# Patient Record
Sex: FEMALE | Race: WHITE | HISPANIC OR LATINO | URBAN - METROPOLITAN AREA
[De-identification: names, ages, dates, MRNs, and addresses within clinical notes are randomized per-mention and may not be internally consistent; named-entity substitution may affect disease eponyms.]

---

## 2022-03-15 ENCOUNTER — EMERGENCY (EMERGENCY)
Facility: HOSPITAL | Age: 42
LOS: 1 days | Discharge: HOME | End: 2022-03-15
Attending: STUDENT IN AN ORGANIZED HEALTH CARE EDUCATION/TRAINING PROGRAM
Payer: COMMERCIAL

## 2022-03-15 VITALS
HEIGHT: 61 IN | DIASTOLIC BLOOD PRESSURE: 90 MMHG | TEMPERATURE: 98 F | RESPIRATION RATE: 16 BRPM | HEART RATE: 83 BPM | OXYGEN SATURATION: 98 % | SYSTOLIC BLOOD PRESSURE: 140 MMHG

## 2022-03-15 DIAGNOSIS — Z98.51 TUBAL LIGATION STATUS: Chronic | ICD-10-CM

## 2022-03-15 LAB — HCG UR QL: NEGATIVE — SIGNIFICANT CHANGE UP

## 2022-03-15 PROCEDURE — 99282 EMERGENCY DEPT VISIT SF MDM: CPT

## 2022-03-15 PROCEDURE — 81025 URINE PREGNANCY TEST: CPT

## 2022-03-15 PROCEDURE — 99283 EMERGENCY DEPT VISIT LOW MDM: CPT

## 2022-03-15 NOTE — ED PROVIDER NOTE - PHYSICAL EXAMINATION
Breast Exam: Lt breast wo skin changes, point ttp at the 9 O'clock position, no nipple dischargee, no swelling.

## 2022-03-15 NOTE — ED PROVIDER NOTE - PATIENT PORTAL LINK FT
You can access the FollowMyHealth Patient Portal offered by [facility] by registering at the following website: [email or web address]. By joining [facility]’s FollowMyHealth portal, you will also be able to view your health information using other applications (apps) compatible with our system.

## 2022-03-15 NOTE — ED PROVIDER NOTE - ADDITIONAL NOTES AND INSTRUCTIONS:
PT was evaluated At [facility] ED and was found to have a condition that warranted time of to rest and heal from WORK/SCHOOL.   Stepan Beebe PA-C

## 2022-03-15 NOTE — ED PROVIDER NOTE - NS ED ROS FT
ROS: CONTUSIONAL: Denies fever, chills, fatigue, wt loss. HEAD: Denies trauma, HA, Dizziness. EYE: Denies Acute visual changes, diplopia. ENMT: Denies change in hearing, tinnitus, epistaxis, difficulty swallowing, sore throat. CARDIO: Denies CP, palpitations, edema. RESP: Denies Cough, SOB , Diff breathing, hemoptysis. GI: Denies N/V, ABD pain, change in bowel movement. URINARY: Denies difficulty urinating, pelvic pain. MS:  Denies joint pain, back pain, weakness, decreased ROM, swelling. NEURO: Denies change in gait, seizures, loss of sensation, dizziness, confusion LOC.  PSY: NO SI/HI. SKIN: breast pain  Denies Rash, bruising.

## 2022-03-15 NOTE — ED ADULT TRIAGE NOTE - CHIEF COMPLAINT QUOTE
Ambulatory reporting "burning' to L breast since last week. Denies fevers. Has not medicated for pain. Denies redness, swelling, inverted nipples or discharge.

## 2022-03-15 NOTE — ED PROVIDER NOTE - CLINICAL SUMMARY MEDICAL DECISION MAKING FREE TEXT BOX
PT with stable VS, no acute distress, non toxic appearing, tolerating PO in the ED, Pt with no acute skin changes, no s/s of abscess, no s/s of infection, Pt educated about need for non emergent follow up, supportive care, educated about when to return to the ED if needed. PT verbalizes that he understands all instructions and results. Pt informed that ED is open and available 24/7 365 days a yr, encouraged to return to the ED if they have any change in condition, or feel the need for revaluation.

## 2022-03-15 NOTE — ED PROVIDER NOTE - OBJECTIVE STATEMENT
PT with SPMHX of tubal ligation presents to the ED with complaint of Lt breast pain x 1 wk. Pt states that she had a gradual onset of Lt breast pain that is intermittent feels sharp in nature, is non radiating painful when touched. Pt dines fever, chills, rash, SOB, diff breathing, HA, dizziness, nipple discharge, swelling, skin changes, family Hx of breast Ca.

## 2022-03-15 NOTE — ED PROVIDER NOTE - CARE PROVIDER_API CALL
Madhuri Dunaway)  Surgery  [address] [place] [address]  Phone: [phone]  Fax: [phone]  Follow Up Time:

## 2022-03-15 NOTE — ED PROVIDER NOTE - NS ED ATTENDING STATEMENT MOD
This was a shared visit with the LUI. I reviewed and verified the documentation and independently performed the documented:

## 2022-03-15 NOTE — ED PROVIDER NOTE - NSFOLLOWUPINSTRUCTIONS_ED_ALL_ED_FT
Breast Tenderness      Breast tenderness is a common problem for women of all ages, but may also occur in men. Breast tenderness may range from mild discomfort to severe pain. In women, the pain usually comes and goes with the menstrual cycle, but it can also be constant.    Breast tenderness has many possible causes, including hormone changes, infections, and taking certain medicines. You may have tests, such as a mammogram or an ultrasound, to check for any unusual findings. Having breast tenderness usually does not mean that you have breast cancer.      Follow these instructions at home:      Managing pain and discomfort    •If directed, put ice to the painful area. To do this:  •Put ice in a plastic bag.      •Place a towel between your skin and the bag.      •Leave the ice on for 20 minutes, 2–3 times a day.        •Wear a supportive bra, especially during exercise. You may also want to wear a supportive bra while sleeping if your breasts are very tender.      Medicines     •Take over-the-counter and prescription medicines only as told by your health care provider. If the cause of your pain is infection, you may be prescribed an antibiotic medicine.      •If you were prescribed an antibiotic, take it as told by your health care provider. Do not stop taking the antibiotic even if you start to feel better.      Eating and drinking   •Your health care provider may recommend that you lessen the amount of fat in your diet. You can do this by:  •Limiting fried foods.      •Cooking foods using methods such as baking, boiling, grilling, and broiling.        •Decrease the amount of caffeine in your diet. Instead, drink more water and choose caffeine-free drinks.        General instructions      •Keep a log of the days and times when your breasts are most tender.      •Ask your health care provider how to do breast exams at home. This will help you notice if you have an unusual growth or lump.      •Keep all follow-up visits as told by your health care provider. This is important.        Contact a health care provider if:    •Any part of your breast is hard, red, and hot to the touch. This may be a sign of infection.    •You are a woman and:  •Not breastfeeding and you have fluid, especially blood or pus, coming out of your nipples.      •Have a new or painful lump in your breast that remains after your menstrual period ends.        •You have a fever.      •Your pain does not improve or it gets worse.      •Your pain is interfering with your daily activities.        Summary    •Breast tenderness may range from mild discomfort to severe pain.      •Breast tenderness has many possible causes, including hormone changes, infections, and taking certain medicines.      •It can be treated with ice, wearing a supportive bra, and medicines.      •Make changes to your diet if told to by your health care provider.      This information is not intended to replace advice given to you by your health care provider. Make sure you discuss any questions you have with your health care provider.

## 2022-10-12 NOTE — ED PROVIDER NOTE - ENMT, MLM
show Airway patent, Nasal mucosa clear. Mouth with normal mucosa. Throat has no vesicles, no oropharyngeal exudates and uvula is midline.

## 2023-06-23 NOTE — ED ADULT TRIAGE NOTE - IDEAL BODY WEIGHT(KG)
48 Soolantra Pregnancy And Lactation Text: This medication is Pregnancy Category C. This medication is considered safe during breast feeding.

## 2024-03-30 NOTE — ED PROVIDER NOTE - NS ED MD EM SELECTION
03/29/24 2015   Final Disposition   Disposition Level of Care IP   Place of Treatment Recommended [facility]       Accepting attending is .    [address] Detailed